# Patient Record
(demographics unavailable — no encounter records)

---

## 2024-10-18 NOTE — PHYSICAL EXAM
[Chaperone Present] : A chaperone was present in the examining room during all aspects of the physical examination [96431] : A chaperone was present during the pelvic exam. [No Acute Distress] : in no acute distress [Well developed] : well developed [Well Nourished] : ~L well nourished [Good Hygeine] : demonstrates good hygeine [Oriented x3] : oriented to person, place, and time [Normal Memory] : ~T memory was ~L unimpaired [Normal Mood/Affect] : mood and affect are normal [Warm and Dry] : was warm and dry to touch [Normal Gait] : gait was normal [Labia Majora] : were normal [Labia Minora] : were normal [Normal] : was normal [Atrophy] : atrophy [Cystocele] : a cystocele [Uterine Prolapse] : uterine prolapse [No Bleeding] : there was no active vaginal bleeding [FreeTextEntry2] : MARVIN Nuñez [Anxiety] : patient is not anxious

## 2024-10-18 NOTE — DISCUSSION/SUMMARY
[FreeTextEntry1] : POP -RS #4 reinserted -She was advised to start stool softener and avoid straining when she moves her bowels -She is not interested in other type of pessary ie. Gelhorn   Incomplete bladder emptying -PVR today 85cc with pessary out  -Continue double voids   UTI -F/u on urine culture, likely to be negative  -If UTI's are persistent will consider UTI ppx  F/u as scheduled on 10/30 to check PVR with pessary in place  All questions answered to the patient's satisfaction.  She expressed understanding. She knows to contact the office with any questions or concerns.

## 2024-10-18 NOTE — PHYSICAL EXAM
[Chaperone Present] : A chaperone was present in the examining room during all aspects of the physical examination [12019] : A chaperone was present during the pelvic exam. [No Acute Distress] : in no acute distress [Well developed] : well developed [Well Nourished] : ~L well nourished [Good Hygeine] : demonstrates good hygeine [Oriented x3] : oriented to person, place, and time [Normal Memory] : ~T memory was ~L unimpaired [Normal Mood/Affect] : mood and affect are normal [Warm and Dry] : was warm and dry to touch [Normal Gait] : gait was normal [Labia Majora] : were normal [Labia Minora] : were normal [Normal] : was normal [Atrophy] : atrophy [Cystocele] : a cystocele [Uterine Prolapse] : uterine prolapse [No Bleeding] : there was no active vaginal bleeding [FreeTextEntry2] : MARVIN Nuñez [Anxiety] : patient is not anxious

## 2024-10-18 NOTE — PROCEDURE
[Straight Catheterization] : insertion of a straight catheter [Urinary Retention] : urinary retention [Patient] : the patient [Allergies Reviewed] : Allergies reviewed [___ Fr Straight Tip] : a [unfilled] in Brazilian straight tip catheter [Clear] : clear [Culture] : culture [Urinalysis] : urinalysis [Good Fit] : fits well [Pessary Inserted] : inserted [None] : no bleeding [Refit] : refit is not needed [Erosion] : no evidence of erosion [Erythema] : no erythema [Discharge] : no vaginal discharge [Infection] : no evidence of infection [FreeTextEntry1] : RS #4  [FreeTextEntry8] : Routine perineal care reviewed

## 2024-10-18 NOTE — PHYSICAL EXAM
[Chaperone Present] : A chaperone was present in the examining room during all aspects of the physical examination [No Acute Distress] : in no acute distress [Well developed] : well developed [Well Nourished] : ~L well nourished [Good Hygeine] : demonstrates good hygeine [Oriented x3] : oriented to person, place, and time [Normal Memory] : ~T memory was ~L unimpaired [Normal Mood/Affect] : mood and affect are normal [Warm and Dry] : was warm and dry to touch [Normal Gait] : gait was normal [Labia Majora] : were normal [Labia Minora] : were normal [Normal] : was normal [Atrophy] : atrophy [Cystocele] : a cystocele [Uterine Prolapse] : uterine prolapse [No Bleeding] : there was no active vaginal bleeding [FreeTextEntry2] : MARVIN Nuñez [Anxiety] : patient is not anxious

## 2024-10-18 NOTE — HISTORY OF PRESENT ILLNESS
[FreeTextEntry1] : Daiana is a 79yo with hx of POP and UUI.  She had been fit with a RS #4 on 8/22/24 but decided to have it removed on 8/30/24 because she was bothered by the vaginal discharge.  Today, she returns to the office with 1 day of voiding difficulty.  She contacted the answering service last night (10/16/24) explaining that she had not voided in 7 hours (she usually voids every 2).  She was experiencing mild pelvic pressure with urinary hesitancy.  No dysuria.  Dr. Hurd reviewed voiding maneuvers with the patient and suggested she go to the ED if still unable to void.  Overnight, Daiana states she voided at 3 am and again at 10am.  She felt she was able to empty her bladder.  Daiana returns to the office today for follow-up.      The patient explains that she was recently admitted to [facility] and is now s/p valve replacement with permanent pacemaker insertion.  She also explains that she has had 2 UTIS within the last month:  9/29/24: >100K Klebsiella  10/5/24: 10-49K E.coli

## 2024-10-18 NOTE — HISTORY OF PRESENT ILLNESS
[FreeTextEntry1] : Daiana is a 79yo with hx of POP and UUI.  She had been fit with a RS #4 on 8/22/24 but decided to have it removed on 8/30/24 because she was bothered by the vaginal discharge.  Today, she returns to the office with 1 day of voiding difficulty.  She contacted the answering service last night (10/16/24) explaining that she had not voided in 7 hours (she usually voids every 2).  She was experiencing mild pelvic pressure with urinary hesitancy.  No dysuria.  Dr. Hurd reviewed voiding maneuvers with the patient and suggested she go to the ED if still unable to void.  Overnight, Daiana states she voided at 3 am and again at 10am.  She felt she was able to empty her bladder.  Daiana returns to the office today for follow-up.      The patient explains that she was recently admitted to [facility] and is now s/p valve replacement with permanent pacemaker insertion.  She also explains that she has had 2 UTIS within the last month:  9/29/24: >100K Klebsiella  10/5/24: 10-49K E.coli

## 2024-10-18 NOTE — HISTORY OF PRESENT ILLNESS
[FreeTextEntry1] : Daiana is a 77yo with hx of incomplete bladder emptying, recurrent UTI's and POP managed with a RS #4.  Pessary was replaced yesterday as she had it removed previously for c/o vaginal discharge.  She reports pessary fell out yesterday when she was straining during a bowel movement.  She reports baseline constipation and is not on medications for this.   She additionally was found to have a PVR of 123 with the pessary out.  She denies UTI symptoms or incomplete bladder emptying. She is requesting the pessary to be replaced.  For her recurrent UTI's, urine cx is pending from yesterday, but UA is low suspicion of UTI.

## 2024-10-18 NOTE — PROCEDURE
[Straight Catheterization] : insertion of a straight catheter [Urinary Retention] : urinary retention [Patient] : the patient [Allergies Reviewed] : Allergies reviewed [___ Fr Straight Tip] : a [unfilled] in [place]rugu[place] straight tip catheter [Clear] : clear [Culture] : culture [Urinalysis] : urinalysis [Good Fit] : fits well [Pessary Inserted] : inserted [None] : no bleeding [Refit] : refit is not needed [Erosion] : no evidence of erosion [Erythema] : no erythema [Discharge] : no vaginal discharge [Infection] : no evidence of infection [FreeTextEntry1] : RS #4  [FreeTextEntry8] : Routine perineal care reviewed

## 2024-10-18 NOTE — PROCEDURE
[Straight Catheterization] : insertion of a straight catheter [Urinary Retention] : urinary retention [Patient] : the patient [Allergies Reviewed] : Allergies reviewed [___ Fr Straight Tip] : a [unfilled] in Guinean straight tip catheter [Clear] : clear [Culture] : culture [Urinalysis] : urinalysis [Good Fit] : fits well [Pessary Inserted] : inserted [None] : no bleeding [Refit] : refit is not needed [Erosion] : no evidence of erosion [Erythema] : no erythema [Discharge] : no vaginal discharge [Infection] : no evidence of infection [FreeTextEntry1] : RS #4  [FreeTextEntry8] : Routine perineal care reviewed

## 2024-10-18 NOTE — DISCUSSION/SUMMARY
[FreeTextEntry1] : #Voiding difficulty: - ml via bladder scanner without pessary in place.   -RS #4 reinserted.  PVR via straight cath after psc reinsertion is 110 ml -These PVRs are within normal limits.  -Reviewed timed voiding q 2-3 hours during the day and voiding techniques (double void, crede and leaning assist) -Patients opts to leave the psc in situ and RTO in 1-2 weeks for repeat PVR and PSC assessment  #Recent UTI: -Udip today obtained in the setting of pelvic pressure and hesitancy.  Udip with small blood.  Low suspicion for acute UTI.  Will send off UA and UCX -Discussed the risks and benefits of initiating estrogen cream therapy.  Patient has a history of breast cancer and does not want to use hormonal therapy. -Will wait for this UCX to results and discuss with Dr. Barber about starting a UTI ppx for the patient.  All questions answered to the patient's satisfaction.  She expressed understanding. She knows to contact the office with any questions or concerns.

## 2024-10-30 NOTE — HISTORY OF PRESENT ILLNESS
[FreeTextEntry1] : Daiana is a 78-year-old with hx of incomplete bladder emptying, recurrent UTI's and POP managed with a RS #4.  She is very happy with the support provided by the pessary.  She can feel the prolapse descending past the psc as the day progresses however this is not particularly bothersome.  She is voiding and moving her bowels without difficulty.  She does feel empty after voiding. No vaginal bleeding.

## 2024-10-30 NOTE — DISCUSSION/SUMMARY
[FreeTextEntry1] : #POP -Continue with RS #4; she does not wish to try a larger pessary -She will RTO in 3 months or sooner if needed  #Incomplete bladder emptying -PVR today 25 ml with psc in place  All questions answered to the patient's satisfaction.  She expressed understanding. She knows to contact the office with any questions or concerns.

## 2024-10-30 NOTE — PHYSICAL EXAM
[Chaperone Present] : A chaperone was present in the examining room during all aspects of the physical examination [33500] : A chaperone was present during the pelvic exam. [No Acute Distress] : in no acute distress [Well developed] : well developed [Well Nourished] : ~L well nourished [Good Hygeine] : demonstrates good hygeine [Oriented x3] : oriented to person, place, and time [Normal Memory] : ~T memory was ~L unimpaired [Normal Mood/Affect] : mood and affect are normal [Warm and Dry] : was warm and dry to touch [Normal Gait] : gait was normal [Labia Majora] : were normal [Labia Minora] : were normal [Normal] : was normal [Atrophy] : atrophy [Discharge] : a  ~M vaginal discharge was present [Scant] : scant [Stepan] : yellow [Thin] : thin [No Bleeding] : there was no active vaginal bleeding [Post Void Residual ____ml] : post void residual was [unfilled] ml [FreeTextEntry2] : MARVIN Nuñez [Anxiety] : patient is not anxious

## 2024-10-30 NOTE — PROCEDURE
[Good Fit] : fits well [Discharge] : there is vaginal discharge [Pessary Inserted] : inserted [Pessary Washed] : washed [None] : no bleeding [Refit] : refit is not needed [Erosion] : no evidence of erosion [Erythema] : no erythema [Infection] : no evidence of infection [FreeTextEntry1] : RS #4  [FreeTextEntry8] : Routine perineal care reviewed

## 2024-12-12 NOTE — DISCUSSION/SUMMARY
[FreeTextEntry1] : #Recent UTI: -Udip today was significant for large blood, proteins, positive nitrates and small leukocytes.  Will send off UA and UCX and follow-up.  - Will treat pt empirically with Keflex 500mg BID x 7 days due to pt being symptomatic and positive Udip.  Pt understands that depending on the sensitivities, we may have to switch antibiotics. - Pt may take Pyridium for symptoms.  Continue to hydrate well.  - Routine perineal care reviewed.  - Reviewed s/s of worsening UTI symptoms such as hematuria, fever, chills and back pain.  Pt knows to contact our office or present to ED for such symptoms.   #POP - Continue with RS #4.  pessary cleaned and reinserted today.  - Pt was able to demonstrate removing and reinserting her pessary today.  Recommended pt to perform self-care once a week.   - Recommended pt to use Replens or Luvena twice a week at bedtime.  Pt has hx of breast CA and does not want to use low dose vaginal estrogen.    RTO in 3 months or sooner as needed. All questions answered to pt satisfaction.  Pt can call the office with any additional questions or concerns; pt verbalized understanding.

## 2024-12-12 NOTE — PROCEDURE
[Straight Catheterization] : insertion of a straight catheter [Urinary Retention] : urinary retention [Patient] : the patient [Allergies Reviewed] : Allergies reviewed [___ Fr Straight Tip] : a [unfilled] in [place] straight tip catheter [Cloudy] : cloudy [Culture] : culture [Urinalysis] : urinalysis [No Complications] : no complications [Tolerated Well] : the patient tolerated the procedure well [Post procedure instructions and information given] : Post procedure instructions and information were given and reviewed with patient. [0] : 0 [Good Fit] : fits well [Refit] : refit is not needed [Erosion] : no evidence of erosion [Erythema] : no erythema [Discharge] : there is vaginal discharge [Infection] : no evidence of infection [Pessary Inserted] : inserted [Pessary Washed] : washed [None] : no bleeding [FreeTextEntry1] : RS #4  [FreeTextEntry8] : Routine perineal care reviewed.  Pt was able to demonstrate removing and reinserting the pessary.  Recommended pt to perform self-care once a week.

## 2024-12-12 NOTE — PHYSICAL EXAM
[Chaperone Present] : A chaperone was present in the examining room during all aspects of the physical examination [73702] : A chaperone was present during the pelvic exam. [FreeTextEntry2] : MARVIN Gardiner [No Acute Distress] : in no acute distress [Well developed] : well developed [Well Nourished] : ~L well nourished [Good Hygeine] : demonstrates good hygeine [Oriented x3] : oriented to person, place, and time [Normal Memory] : ~T memory was ~L unimpaired [Normal Mood/Affect] : mood and affect are normal [Anxiety] : patient is not anxious [Warm and Dry] : was warm and dry to touch [Normal Gait] : gait was normal [Labia Majora] : were normal [Labia Minora] : were normal [Atrophy] : atrophy [Cystocele] : a cystocele [Uterine Prolapse] : uterine prolapse [Discharge] : a  ~M vaginal discharge was present [Scant] : scant [White] : white [Thin] : thin [No Bleeding] : there was no active vaginal bleeding [Normal] : normal

## 2024-12-12 NOTE — HISTORY OF PRESENT ILLNESS
[FreeTextEntry1] : Daiana is a 79yo with hx of POP and UUI.  Her POP is supported with RS #4 and she is happy with support from the pessary.  She reports that sometimes she feels prolapse descending past the pessary but this is not particularly bothersome.  She would like to how to perform self-care.  Starting 3 days ago, she reports increased urinary frequency, burning with urination and some discomfort with voiding.  She denies cloudy or foul-smelling urine, hematuria, back pain, fever, or chills.  The patient reports that in September she was admitted to [facility] and is now s/p valve replacement with permanent pacemaker insertion.  She also explains that she has had 2 UTIS after that:  9/29/24: >100K Klebsiella  10/5/24: 10-49K E.coli  Her U-dips in this office have been negative.

## 2025-03-07 NOTE — DISCUSSION/SUMMARY
[FreeTextEntry1] : #POP - Continue with RS #4.  - Continue to perform self-care once a week.   - Routine precautions reviewed.  - Recommended pt to use Replens or Luvena twice a week at bedtime.  Pt has hx of breast CA and does not want to use low dose vaginal estrogen.    RTO in 3 months (pt preference) or sooner as needed. All questions answered to pt satisfaction.  Pt can call the office with any additional questions or concerns; pt verbalized understanding.

## 2025-03-07 NOTE — HISTORY OF PRESENT ILLNESS
[FreeTextEntry1] : Daiana is a 77yo with hx of POP and UUI.  Her POP is supported with RS #4 and she is happy with support from the pessary.  She reports that sometimes she feels prolapse descending past the pessary after having bowel movement but this is not particularly bothersome.  She performs self-care weekly.  She denies any issues with voiding or moving her bowls.  She denies any uteropelvic pain or bleeding.    The patient reports that she recently had heart block with subsequent pacemaker insertion and she is on Eliquis PO daily.

## 2025-03-07 NOTE — PHYSICAL EXAM
[No Acute Distress] : in no acute distress [Well developed] : well developed [Well Nourished] : ~L well nourished [Good Hygeine] : demonstrates good hygeine [Oriented x3] : oriented to person, place, and time [Normal Memory] : ~T memory was ~L unimpaired [Normal Mood/Affect] : mood and affect are normal [Warm and Dry] : was warm and dry to touch [Normal Gait] : gait was normal [Labia Majora] : were normal [Labia Minora] : were normal [Atrophy] : atrophy [Cystocele] : a cystocele [Uterine Prolapse] : uterine prolapse [Discharge] : a  ~M vaginal discharge was present [Scant] : scant [White] : white [Thin] : thin [No Bleeding] : there was no active vaginal bleeding [Normal] : normal [Anxiety] : patient is not anxious

## 2025-03-07 NOTE — PROCEDURE
[Good Fit] : fits well [Discharge] : there is vaginal discharge [Pessary Inserted] : inserted [Pessary Washed] : washed [None] : no bleeding [Refit] : refit is not needed [Erosion] : no evidence of erosion [Erythema] : no erythema [Infection] : no evidence of infection [FreeTextEntry1] : RS #4  [FreeTextEntry8] : Routine perineal care reviewed.  Pt was able to demonstrate removing and reinserting the pessary.  Recommended pt to perform self-care once a week.

## 2025-04-23 NOTE — HISTORY OF PRESENT ILLNESS
[FreeTextEntry1] : pt here fo r f/u on prolapse - she does self care.  She feels prolapse beyond the pessary and worse with voiding and BM.  She was originally here for discussion of surgical treatment options for her prolapse however she has changes since her last visit here with multiple complaints Recently saw some vaginal spotting pt having difficulty voiding and has urinary frequncy.   pt had TAVR - Sept 2024, pacemaker. On April 1 - pt had incarcerated R inguinal hernia and had emergency surgery. her pessary has been out since the surgery.

## 2025-04-23 NOTE — PHYSICAL EXAM
[MA] : MA [Rectocele] : a rectocele [Cystocele] : a cystocele [Uterine Prolapse] : uterine prolapse [Aa ____] : Aa [unfilled] [Ba ____] : Ba [unfilled] [C ____] : C [unfilled] [GH ____] : GH [unfilled] [PB ____] : PB [unfilled] [TVL ____] : TVL  [unfilled] [Ap ____] : Ap [unfilled] [Bp ____] : Bp [unfilled] [D ____] : D [unfilled] [Post Void Residual ____ml] : post void residual was [unfilled] ml [Normal rectal exam] : was normal [FreeTextEntry2] : Jeannie [Well developed] : well developed [Well Nourished] : ~L well nourished [Good Hygeine] : demonstrates good hygeine [Normal Mood/Affect] : mood and affect are normal [Anxiety] : patient is not anxious [Tenderness] : ~T no ~M abdominal tenderness observed [Distended] : not distended [Normal] : normal external genitalia [Vulvar Atrophy] : vulvar atrophy [Normal Appearance] : general appearance was normal

## 2025-04-23 NOTE — DISCUSSION/SUMMARY
[FreeTextEntry1] : I reviewed the above findings with the patient.  We discussed surgical versus nonsurgical options.  Her pessary was refit today.  At this point in time she would like to see how she feels with this new pessary before having further discussion regarding surgical treatment options.  We discussed going for a pelvic ultrasound for the postmenopausal bleeding.  We discussed returning to the office in approximately 2 weeks to check this new pessary and see how she is doing symptomatically. We discussed that urine dipstick was positive for trace blood and nitrites and we will send off urinalysis microscopy and culture.  All questions were answered..

## 2025-04-23 NOTE — PROCEDURE
[Good Fit] : fits well [Refit] : refit needed [Pessary Inserted] : inserted [None] : no bleeding [Erosion] : no evidence of erosion [Erythema] : no erythema [Discharge] : no vaginal discharge [Infection] : no evidence of infection [FreeTextEntry1] : Refitted to #5 open ring (ring #5 with diaphragm unavailable)

## 2025-05-05 NOTE — PHYSICAL EXAM
[No Acute Distress] : in no acute distress [Well developed] : well developed [Well Nourished] : ~L well nourished [Good Hygeine] : demonstrates good hygeine [Oriented x3] : oriented to person, place, and time [Normal Memory] : ~T memory was ~L unimpaired [Normal Mood/Affect] : mood and affect are normal [Normal Gait] : gait was normal [Vulvar Atrophy] : vulvar atrophy [Labia Majora] : were normal [Labia Minora] : were normal [Atrophy] : atrophy [Discharge] : a  ~M vaginal discharge was present [Scant] : scant [Stepan] : yellow [Thin] : thin [No Bleeding] : there was no active vaginal bleeding [Normal] : normal [Anxiety] : patient is not anxious

## 2025-05-05 NOTE — HISTORY OF PRESENT ILLNESS
[FreeTextEntry1] : Daiana is a 78-year-old with POP.  She was refit to an OR #5 (RS #5 out of stock) on 4/23/25 and finds her new pessary more supportive than her old one (RS #4).  She usually performs self-care weekly however has not performed self-care since she was last seen in this office.  While she was here last, urine was obtained in the setting of urinary frequency.  She took 1 dose of an antibiotic she had at home but was not otherwise empirically treated.  While waiting for the results of the urine culture, she went to [facility] over the weekend with worsening UTI symptoms of dysuria and pink on the toilet paper.  No blood seen in the toilet bowl.  She was treated with Keflex by the urgent care which the UCX from 4/23/25 was susceptible to.      UCX obtain in this office grew >100K Klebsiella  UCX obtained on 4/26/25 at an urgent care grew <10K NUF.        Today, she has no acute UTI complaints.  She has not yet gone for the recommended TVUS that was also ordered on 4/23/25.

## 2025-05-05 NOTE — DISCUSSION/SUMMARY
[FreeTextEntry1] : #POP: -Continue with OR #5 (will give her a RS #5 when back in stock). -Continue weekly self-care. -Reviewed the indication for the previously prescribed TVUS including but not limited to ruling out malignancy.  The patient states understanding and will consider going for the imaging.  She explains that she will not pursue any intervention if there are abnormalities seen on the TVUS.  She has an appointment with Dr. Coelho at the end of this month and will discuss this further.   #Dispo: -RTO in 3 months or sooner if needed (per patient reference)  All questions answered to the patient's satisfaction.  She expressed understanding. She knows to contact the office with any questions or concerns. within normal limits

## 2025-05-05 NOTE — PROCEDURE
[Good Fit] : fits well [Discharge] : there is vaginal discharge [Pessary Inserted] : inserted [Pessary Washed] : washed [None] : no bleeding [Refit] : refit is not needed [Erosion] : no evidence of erosion [Erythema] : no erythema [Infection] : no evidence of infection [FreeTextEntry1] : RS #5 [FreeTextEntry8] : Continue self-care

## 2025-05-06 NOTE — PHYSICAL EXAM
[Normal Breath Sounds] : Normal breath sounds [Normal Heart Sounds] : normal heart sounds [Normal Rate and Rhythm] : normal rate and rhythm [No Rash or Lesion] : No rash or lesion [Alert] : alert [Oriented to Person] : oriented to person [Oriented to Place] : oriented to place [Oriented to Time] : oriented to time [Calm] : calm [de-identified] : Elderly woman in no acute distress [de-identified] : JEANCARLOS, SONYA, ALEXANDRAMI. [de-identified] : Pacemaker left anterior chest wall [de-identified] : Soft, nontender nondistended, positive bowel sounds in all four quads.  No hernia or masses.  Surgical incisions remain well approximated and healing appropriately without erythema, induration or fluctuance.  Adalgisa-incisional ecchymosis [de-identified] : Ambulating without difficulty or assistance